# Patient Record
Sex: MALE | Race: ASIAN | NOT HISPANIC OR LATINO | URBAN - METROPOLITAN AREA
[De-identification: names, ages, dates, MRNs, and addresses within clinical notes are randomized per-mention and may not be internally consistent; named-entity substitution may affect disease eponyms.]

---

## 2018-05-25 ENCOUNTER — OFFICE VISIT - HEALTHEAST (OUTPATIENT)
Dept: FAMILY MEDICINE | Facility: CLINIC | Age: 38
End: 2018-05-25

## 2018-05-25 DIAGNOSIS — H66.93 BILATERAL OTITIS MEDIA: ICD-10-CM

## 2018-05-25 DIAGNOSIS — J32.9 SINUSITIS: ICD-10-CM

## 2018-05-25 DIAGNOSIS — J30.9 ALLERGIC RHINITIS: ICD-10-CM

## 2018-05-31 ENCOUNTER — OFFICE VISIT - HEALTHEAST (OUTPATIENT)
Dept: ALLERGY | Facility: CLINIC | Age: 38
End: 2018-05-31

## 2018-05-31 DIAGNOSIS — J30.9 CHRONIC ALLERGIC RHINITIS, UNSPECIFIED SEASONALITY, UNSPECIFIED TRIGGER: ICD-10-CM

## 2018-05-31 ASSESSMENT — MIFFLIN-ST. JEOR: SCORE: 1399.55

## 2018-06-07 ENCOUNTER — COMMUNICATION - HEALTHEAST (OUTPATIENT)
Dept: ALLERGY | Facility: CLINIC | Age: 38
End: 2018-06-07

## 2018-06-12 ENCOUNTER — AMBULATORY - HEALTHEAST (OUTPATIENT)
Dept: ALLERGY | Facility: CLINIC | Age: 38
End: 2018-06-12

## 2018-06-12 DIAGNOSIS — J30.1 SEASONAL ALLERGIC RHINITIS DUE TO POLLEN, UNSPECIFIED CHRONICITY: ICD-10-CM

## 2018-06-15 ENCOUNTER — AMBULATORY - HEALTHEAST (OUTPATIENT)
Dept: ALLERGY | Facility: CLINIC | Age: 38
End: 2018-06-15

## 2018-06-15 DIAGNOSIS — J30.1 SEASONAL ALLERGIC RHINITIS DUE TO POLLEN, UNSPECIFIED CHRONICITY: ICD-10-CM

## 2020-03-10 ENCOUNTER — OFFICE VISIT (OUTPATIENT)
Dept: FAMILY MEDICINE | Facility: CLINIC | Age: 40
End: 2020-03-10
Payer: COMMERCIAL

## 2020-03-10 VITALS
OXYGEN SATURATION: 99 % | WEIGHT: 136 LBS | RESPIRATION RATE: 16 BRPM | SYSTOLIC BLOOD PRESSURE: 126 MMHG | TEMPERATURE: 97.7 F | DIASTOLIC BLOOD PRESSURE: 79 MMHG | BODY MASS INDEX: 25.03 KG/M2 | HEIGHT: 62 IN | HEART RATE: 69 BPM

## 2020-03-10 DIAGNOSIS — J06.9 VIRAL URI WITH COUGH: ICD-10-CM

## 2020-03-10 DIAGNOSIS — R59.9 REACTIVE LYMPHADENOPATHY: Primary | ICD-10-CM

## 2020-03-10 DIAGNOSIS — Z23 NEED FOR PROPHYLACTIC VACCINATION AND INOCULATION AGAINST INFLUENZA: ICD-10-CM

## 2020-03-10 ASSESSMENT — MIFFLIN-ST. JEOR: SCORE: 1408.14

## 2020-03-10 NOTE — PATIENT INSTRUCTIONS
- Continue taking Tylenol as needed, would recommend 2-3x a day  - Return if feeling worse, or in the next week if not improved

## 2020-03-10 NOTE — PROGRESS NOTES
Preceptor Attestation:   Patient seen, evaluated and discussed with the resident. I have verified the content of the note, which accurately reflects my assessment of the patient and the plan of care.   Supervising Physician:  Sylvia Knapp MD

## 2020-03-10 NOTE — PROGRESS NOTES
"[facility] Visit    Subjective:  Kailey Morrissey is a 39 year old male with a PMHx significant for   Patient Active Problem List   Diagnosis     Other allergy, other than to medicinal agents     Disorder of bursae and tendons in shoulder region     Environmental allergies     Osteoarthritis     Rotator cuff tendonitis    who presents with a sore throat.     Patient reports 8-9 days of a sore throat.  He feels focal tenderness on both sides of his upper trachea, just below his mandible.  He can't feel any swollen/tendern lumps but does think it generally feel [place]wood there.  No subjective fevers, chills.  He doesn't feel sick currently but had a febrile illness prior to his sore throat that resolved.  Today, no fatigue, lethargy, runny nose, sinus drainage/tenderness, headache, vision changes, ear pain, wheezing, vomiting, abdominal pain, regurgitation, reflux, diarrhea, rash.  No sick contacts or recent travel.  Has tried Tylenol a few times with some relief.     Preventative care: not UTD, agreeable for flu shot but not lipids or HIV.     Objective:  Vitals:    03/10/20 1618   BP: 126/79   BP Location: Left arm   Pulse: 69   Resp: 16   Temp: 97.7  F (36.5  C)   TempSrc: Oral   SpO2: 99%   Weight: 61.7 kg (136 lb)   Height: 1.578 m (5' 2.13\")     Body mass index is 24.77 kg/m .    GEN: NAD, healthy, alert  EYES: grossly normal to inspection, PERRL, EOMI, normal conjunctivae/sclerae  HENT: normal ear canals/TM's, nose & mouth w/o ulcers or lesions, clear oropharynx, MMM, no tonsillar swelling/exudates/erythema, no erythema in posterior mouth, no uvula deviation, no swelling or masses  NECK: mild focal tenderness on either side of trachea/esophagus just below mandible. No LAD was appreciated. No masses. No thyroid nodules or tenderness  RESP: CTAB, no w/r/r  CV: RRR, nl S1/S2, no m/r/g, no peripheral edema  MSK: no MSK defects noted, gait is age appropriate w/o ataxia  SKIN: no suspicious lesions or " rashes  NEURO: no obvious focal deficits, mentation intact, speech normal    Assessment/Plan:  Kailey was seen today for pharyngitis and cough.    Diagnoses and all orders for this visit:    Reactive lymphadenopathy  Viral URI with cough  Patient presents with almost 9 days of sore throat after a febrile illness.  He likely had a viral URI that is resolving, and now experiencing reactive lymphadenopathy.  He points to focal areas over his submandibular lymph nodes as tender areas.  He denies any significant respiratory symptoms concerning for trachea problems nor GI symptoms concerning for esophagitis.  He has no symptoms concerning for thyroid disease and has no tenderness/nodules on exam.  No s/sx of other infection on exam.  His vitals are normal and he appears quite well today.  Recommend symptomatic treatment (hydration, rest) and Tylenol PRN.  RTC or go to ED if concerns.       Options for treatment and follow-up care were reviewed with the patient who was engaged and actively involved in the decision making process, verbalized understanding of the options discussed, and satisfied with the final plan.    Patient was staffed with supervising physician, Dr. Knapp.     Joqauin Waller MD, PGY3  [facility]

## 2021-06-01 VITALS — BODY MASS INDEX: 24.23 KG/M2 | WEIGHT: 134.6 LBS

## 2021-06-01 VITALS — HEIGHT: 63 IN | BODY MASS INDEX: 23.92 KG/M2 | WEIGHT: 135 LBS

## 2021-06-18 NOTE — PROGRESS NOTES
RW  1:1000 V/V EXP 08/14/2018  1:100 V/V EXP 10/14/2018  1:10 V /V EXP 6/14/2019  1:1 V /V EXP 6/14/2019  POLLENS  1:1000 V/V EXP 8/14/2018  1:100 V/V EXP 10/14/2018  1:10 V /V EXP 6/14/2019  1:1 V /V EXP 6/14/2019      HE MPW  CHECKED BY LT  CHARGED 60 UNITS of 60 UNITS

## 2021-06-18 NOTE — PROGRESS NOTES
"Chief complaint:  Start Allergy Shots    History of present illness: This is a pleasant 38-year-old gentleman who is here today to discuss allergy shots.  I saw him in 2016.  He is positive at the time to ragweed and Bhavani.  He reports his nasal congestion continues to increase.  At that time he had discussed allergy shots.  He states he has thought about this and would like to start allergy shots.  Antihistamines and nasal sprays have been ineffective for him in the past.  He has not been on Singulair.  He has no history of asthma.  No cough, wheeze or shortness of breath.  He states he has a lot of drainage in his eyes are very itchy.    Past medical history, social history, family medical history, meds and allergies reviewed and updated accordingly.    Review of Systems performed as above and the remainder is negative.      Current Outpatient Prescriptions:      EPINEPHrine (EPIPEN) 0.3 mg/0.3 mL injection, Inject into thigh for allergic reaction, Disp: 2 Pre-filled Pen Syringe, Rfl: 0     montelukast (SINGULAIR) 10 mg tablet, Take 1 tablet (10 mg total) by mouth at bedtime., Disp: 30 tablet, Rfl: 1    Allergies   Allergen Reactions     Histamine H2 Inhibitors      Histamine control on allergy test     Ragweed Pollen      Kyree      Kyree/digwood     Sugardyne      Maple sugar tree       Pulse 60  Ht 5' 2.5\" (1.588 m)  Wt 135 lb (61.2 kg)  BMI 24.3 kg/m2  Gen: Pleasant male not in acute distress  HEENT: Eyes no erythema of the bulbar or palpebral conjunctiva, no edema. Ears: TMs well visualized, no effusions. Nose: congestion, mucosa pale. Mouth: Throat drainage , no lip or tongue edema.   Cardiac: Regular rate and rhythm, no murmurs, rubs or gallops  Respiratory: Clear to auscultation bilaterally, no adventitious breath sounds    Skin: No rashes or lesions  Psych: Alert and oriented times 3    Impression report and plan:    1.  Allergic rhinitis    I went over the risks and benefits of allergy shots.  I stated " risks include hives, swelling, shortness of breath.  I did state that one in 2.5 million shot administrations can result in death.  I stated they must wait in the office for 30 minutes following the shot and carry an epinephrine device on the day of the shot.  I stated that shots are effective in about 90% of patients.  I stated that they should check with the insurance company prior to proceeding.  They understand the risks and benefits and agreed to proceed. I would like him to start montelukast.  I cautioned him to the rare side effect of mood disturbance.  Follow during allergy shots.    Time spent with patient, 25 minutes, greater than half spent counseling and coordination of care regarding allergy shots.

## 2021-06-18 NOTE — PROGRESS NOTES
RW  1:1000 V/V EXP 08/14/2018  1:100 V/V EXP 10/14/2018  1:10 V /V EXP 6/14/2019  1:1 V /V EXP 6/14/2019  POLLENS  1:1000 V/V EXP 8/14/2018  1:100 V/V EXP 10/14/2018  1:10 V /V EXP 6/14/2019  1:1 V /V EXP 6/14/2019      HE MPW  CHECKED BY LT  CHARGED 30 UNITS of 60 UNITS

## 2021-06-26 NOTE — PROGRESS NOTES
Progress Notes by Sammy Aguilar DO at 5/25/2018 11:00 AM     Author: Sammy Aguilar DO Service: -- Author Type: Physician    Filed: 5/25/2018 12:41 PM Encounter Date: 5/25/2018 Status: Signed    : Sammy Aguilar DO (Physician)       Chief Complaint   Patient presents with   ? Nasal congestion - when lays down at night has a hard time     breathing is not on any medicine for allergies - see his phone - had allergy testing done with Dr Moshe asher at [facility].        History of Present Illness: Rooming staff notes reviewed. Patient has itchy nose and eyes, especially in the morning. He has daily nasal congestion, less severe in the Winter time. No asthma like symptoms. He has not gotten significant relief using cetrizine, or a nasal spray (unsure what the name was).  I reviewed the notes by the allergist from 2016, where she suggested trying montelukast for allergy treatment.  He has not yet tried this.  Besides the allergy symptoms, he has also had some discomfort in his ears for the past 2 months.    Review of systems: See history of present illness, otherwise negative.     Current Outpatient Prescriptions   Medication Sig Dispense Refill   ? amoxicillin (AMOXIL) 500 MG capsule Take 2 capsules (1,000 mg total) by mouth 2 (two) times a day for 10 days. 40 capsule 0   ? montelukast (SINGULAIR) 10 mg tablet Take 1 tablet (10 mg total) by mouth daily. 30 tablet 2     No current facility-administered medications for this visit.        No past medical history on file.   No past surgical history on file.   Social History     Social History   ? Marital status:      Spouse name: N/A   ? Number of children: N/A   ? Years of education: N/A     Social History Main Topics   ? Smoking status: Never Smoker   ? Smokeless tobacco: Never Used   ? Alcohol use None   ? Drug use: None   ? Sexual activity: Not Asked     Other Topics Concern   ? None     Social History Narrative       History   Smoking Status   ? Never Smoker    Smokeless Tobacco   ? Never Used      Exam:   Blood pressure 100/70, pulse 70, temperature 97.7  F (36.5  C), temperature source Oral, resp. rate 20, weight 134 lb 9.6 oz (61.1 kg), SpO2 97 %.    EXAM:   General: Vital signs reviewed. Patient is in no acute appearing distress. Breathing is non labored appearing. Patient is alert and oriented x 3.   ENT: Ear exam shows bilateral TM dullness and injection, nasal turbinates are injected and edematous with clear mucoid rhinorrhea, no pharyngeal injection with increased post nasal drainage noted.  Patient frequently sniffles during exam.  Neck: supple with no adenopathy.  Heart: Normal rate and rhythm without murmur  Lungs: Clear to auscultation with good air flow bilaterally.  Skin: warm and dry    Assessment/Plan   1. Allergic rhinitis  montelukast (SINGULAIR) 10 mg tablet   2. Sinusitis  amoxicillin (AMOXIL) 500 MG capsule   3. Bilateral otitis media  amoxicillin (AMOXIL) 500 MG capsule       Patient Instructions     Also see info below. Try to follow up with the allergist, or your primary care provider. Take your amoxicillin until it is all taken. Take your Singulair (montelucast) daily. I think your sinus infection and ear infection is related to your chronic allergies. The better you control your allergies, the fewer infections you will likely get.    Allergic Rhinitis  Allergic rhinitis is an allergic reaction that affects the nose, and often the eyes. Its often known as nasal allergies. Nasal allergies are often due to things in the environment that are breathed in. Depending what you are sensitive to, nasal allergies may occur only during certain seasons. Or they may occur year round. Common indoor allergens include house dust mites, mold, cockroaches, and pet dander. Outdoor allergens include pollen from trees, grasses, and weeds.   Symptoms include a drippy, stuffy, and itchy nose. They also include sneezing and red and itchy eyes. You may feel tired more  often. Severe allergies may also affect your breathing and trigger a condition called asthma.   Tests can be done to see what allergens are affecting you. You may be referred to an allergy specialist for testing and further evaluation.  Home care  Your healthcare provider may prescribe medicines to help relieve allergy symptoms. These may include oral medicines, nasal sprays, or eye drops.  Ask your provider for advice on how to avoid substances that you are allergic to. Below are a few tips for each type of allergen.  Pet dander:    Do not have pets with fur and feathers.    If you can't avoid having a pet, keep it out of your bedroom and off upholstered furniture.  Pollen:    When pollen counts are high, keep windows of your car and home closed. If possible, use an air conditioner instead.    Wear a filter mask when mowing or doing yard work.  House dust mites:    Wash bedding every week in warm water and detergent and dry on a hot setting.    Cover the mattress, box spring, and pillows with allergy covers.     If possible, sleep in a room with no carpet, curtains, or upholstered furniture.  Cockroaches:    Store food in sealed containers.    Remove garbage from the home promptly.    Fix water leaks  Mold:    Keep humidity low by using a dehumidifier or air conditioner. Keep the dehumidifier and air conditioner clean and free of mold.    Clean moldy areas with bleach and water.  In general:    Vacuum once or twice a week. If possible, use a vacuum with a high-efficiency particulate air (HEPA) filter.    Do not smoke. Avoid cigarette smoke. Cigarette smoke is an irritant that can make symptoms worse.  Follow-up care  Follow up as advised by the healthcare provider or our staff. If you were referred to an allergy specialist, make this appointment promptly.  When to seek medical advice  Call your healthcare provider right away if the following occur:    Coughing or wheezing    Fever of 100.4 F (38 C) or higher, or as  directed by your healthcare provider    Raised red bumps (hives)    Continuing symptoms, new symptoms, or worsening symptoms  Call 911 if you have:    Trouble breathing    Severe swelling of the face or severe itching of the eyes or mouth  Date Last Reviewed: 3/1/2017    4118-9508 [employer]. [address] [place] [address]. All rights reserved. This information is not intended as a substitute for professional medical care. Always follow your healthcare professional's instructions.        Causes of Nasal Allergies    Nasal allergies are most commonly caused by one or more of 4 kinds of allergens: pollen (which causes seasonal allergies), house-dust mites, mold, and animals. Other substances, called irritants, can bother the nose and make allergy symptoms worse.  Pollen  Plants reproduce by moving tiny grains of pollen from plant to plant. Some pollen is carried by bees, and some is blown by the wind. Its the wind-blown pollen that causes nasal allergies. The amount of pollen in the air varies from season to season.  House-dust mites  House-dust mites are tiny bugs too small to see. They can live in mattresses, blankets, stuffed toys, carpets, and curtains. The droppings of these mites are a common indoor cause of nasal allergies.  Mold  Mold loves dark, damp areas. It tends to grow in bathrooms, basements, refrigerators, and in the soil of houseplants. Mold reproduces by sending tiny grains called spores into the air. If these spores are breathed in, they can cause a nasal allergic reaction.  Animals  Pets, such as cats, dogs, birds, horses, and rabbits, are common causes of nasal allergies. Flakes of skin (dander), saliva left on fur when an animal cleans itself, urine in litter boxes and cages, and feathers can all cause nasal allergies.  Irritants make allergies worse  Although irritants dont cause nasal allergies, they can make allergy symptoms worse. Cigarette smoke, perfume, aerosol  sprays, smoke from wood stoves or fireplaces, car exhaust, and strong odors are examples of irritants.   Date Last Reviewed: 9/1/2016 2000-2017 [employer]. [address] [place] [address], [place] [address]. All rights reserved. This information is not intended as a substitute for professional medical care. Always follow your healthcare professional's instructions.        Understanding Nasal Allergies  Nasal allergies (also called allergic rhinitis) are a common health problem. They may be seasonal. This means they cause symptoms only at certain times of the year. Or they may be perennial. This means they cause symptoms all year long. Other health problems, such as asthma, often occur along with allergies as well.    What is an allergic reaction?  An allergy is a reaction to a substance called an allergen. Common allergens include:    Wind-borne pollen    Mold    Dust mites    Furry and feathered animals    Cockroaches  Normally, allergens are harmless. But when a person has allergies, the body thinks they are harmful. The body then attacks allergens with antibodies. Antibodies are attached to special cells called mast cells. Allergens stick to the antibodies. This makes the mast cells release histamine and other chemicals. This is an allergic reaction. The chemicals irritate nearby nasal tissue. This causes nasal allergy symptoms.  Common nasal allergy symptoms  Allergies can cause nasal tissue to swell. This makes the air passages smaller. The nose may feel stuffed up. The nose may also make extra mucus, which can plug the nasal passages or drip out of the nose. Mucus can drip down the back of the throat (postnasal drip) as well. Sinus tissue can swell. This may cause pain and headache. Common allergy symptoms include:    Runny nose with clear, watery discharge    Stuffy nose (nasal congestion)    Drainage down your throat (postnasal drip)    Sneezing    Red, watery eyes    Itchy nose, eyes, ears, and  throat    Plugged-up ears (ear congestion)    Sore throat    Coughing    Sinus pain and swelling    Headache  It may not be allergies  Other health problems can cause symptoms like those of nasal allergies. These include:    Nonallergic rhinitis and viruses such as colds    Irritants and pollutants, such as strong odors or smoke    Certain medicines    Changes in the weather   Treatment  Your healthcare provider will evaluate you to find the cause of your symptoms then recommend treatment. If your symptoms are due to nasal allergies, your healthcare provider may prescribe nasal steroid sprays or oral antihistamines to help reduce symptoms. Avoidance of the allergen will also be suggested. You may also be referred to an allergist.   Date Last Reviewed: 10/1/2016    0122-9670 [employer]. [address] [place] [address], [place] [address]. All rights reserved. This information is not intended as a substitute for professional medical care. Always follow your healthcare professional's instructions.        Acute Sinusitis    Acute sinusitis is irritation and swelling of the sinuses. It is usually caused by a viral infection after a common cold. Your doctor can help you find relief.  What is acute sinusitis?  Sinuses are air-filled spaces in the skull behind the face. They are kept moist and clean by a lining of mucosa. Things such as pollen, smoke, and chemical fumes can irritate the mucosa. It can then swell up. As a response to irritation, the mucosa makes more mucus and other fluids. Tiny hairlike cilia cover the mucosa. Cilia help carry mucus toward the opening of the sinus. Too much mucus may cause the cilia to stop working. This blocks the sinus opening. A buildup of fluid in the sinuses then causes pain and pressure. It can also encourage bacteria to grow in the sinuses.  Common symptoms of acute sinusitis  You may have:    Facial soreness pain    Headache    Fever    Fluid draining in the back of the throat  (postnasal drip)    Congestion    Drainage that is thick and colored, instead of clear    Cough  Diagnosing acute sinusitis  Your doctor will ask about your symptoms and health history. He or she will look at your ear, nose, and throat. You usually won't need to have X-rays taken.    The doctor may take a sample of mucus to check for bacteria. If you have sinusitis that keeps coming back, you may need imaging tests such as X-rays or CAT scans. This will help your doctor check for a structural problem that may be causing the infection.  Treating acute sinusitis  Treatment is aimed at unblocking the sinus opening and helping the cilia work again. You may need to take antihistamine and decongestant medicine. These can reduce inflammation and decrease the amount of fluid your sinuses make. If you have a bacterial infection, you will need to take antibiotic medicine for 10 to 14 days. Take this medicine until it is gone, even if you feel better.  Date Last Reviewed: 10/1/2016    9896-8276 [employer]. [address] [place] [address]. All rights reserved. This information is not intended as a substitute for professional medical care. Always follow your healthcare professional's instructions.      Montelukast Sodium Oral tablet  What is this medicine?  MONTELUKAST (mon te LOO kast) is used to prevent and treat the symptoms of asthma. It is also used to treat allergies. Do not use for an acute asthma attack.  This medicine may be used for other purposes; ask your health care provider or pharmacist if you have questions.  What should I tell my health care provider before I take this medicine?  They need to know if you have any of these conditions:    liver disease    an unusual or allergic reaction to montelukast, other medicines, foods, dyes, or preservatives    pregnant or trying to get pregnant    breast-feeding  How should I use this medicine?  This medicine should be given by mouth. Follow the  directions on the prescription label. Take this medicine at the same time every day. You may take this medicine with or without meals. Do not chew the tablets. Do not stop taking your medicine unless your doctor tells you to.  Talk to your pediatrician regarding the use of this medicine in children. Special care may be needed. While this drug may be prescribed for children as young as 15 years of age for selected conditions, precautions do apply.  Overdosage: If you think you have taken too much of this medicine contact a poison control center or emergency room at once.  NOTE: This medicine is only for you. Do not share this medicine with others.  What if I miss a dose?  If you miss a dose, take it as soon as you can. If it is almost time for your next dose, take only that dose. Do not take double or extra doses.  What may interact with this medicine?    anti-infectives like rifampin and rifabutin    medicines for diabetes like rosiglitazone and repaglinide    medicines for seizures like phenytoin, phenobarbital, and carbamazepine    paclitaxel  This list may not describe all possible interactions. Give your health care provider a list of all the medicines, herbs, non-prescription drugs, or dietary supplements you use. Also tell them if you smoke, drink alcohol, or use illegal drugs. Some items may interact with your medicine.  What should I watch for while using this medicine?  Visit your doctor or health care professional for regular checks on your progress. Tell your doctor or health care professional if your allergy or asthma symptoms do not improve. Take your medicine even when you do not have symptoms. Do not stop taking any of your medicine(s) unless your doctor tells you to.  If you have asthma, talk to your doctor about what to do in an acute asthma attack. Always have your inhaled rescue medicine for asthma attacks with you.  Patients and their families should watch for new or worsening thoughts of suicide  or depression. Also watch for sudden changes in feelings such as feeling anxious, agitated, panicky, irritable, hostile, aggressive, impulsive, severely restless, overly excited and hyperactive, or not being able to sleep. Any worsening of mood or thoughts of suicide or dying should be reported to your health care professional right away.  What side effects may I notice from receiving this medicine?  Side effects that you should report to your doctor or health care professional as soon as possible:    allergic reactions like skin rash or hives, or swelling of the face, lips, or tongue    breathing problems    confusion    dark urine    fever or infection    flu-like symptoms    hallucinations    painful lumps under the skin    pain, tingling, numbness in the hands or feet    sinus pain or swelling    suicidal thoughts or other mood changes    trouble sleeping    unusual bleeding or bruising    yellowing of the eyes or skin  Side effects that usually do not require medical attention (report to your doctor or health care professional if they continue or are bothersome):    cough    dizziness    drowsiness    headache    nightmares    stomach upset    stuffy nose  This list may not describe all possible side effects. Call your doctor for medical advice about side effects. You may report side effects to FDA at 3-825-FDA-4653.  Where should I keep my medicine?  Keep out of the reach of children.  Store at room temperature between 15 and 30 degrees C (59 and 86 degrees F). Protect from light and moisture. Keep this medicine in the original bottle. Throw away any unused medicine after the expiration date.  NOTE:This sheet is a summary. It may not cover all possible information. If you have questions about this medicine, talk to your doctor, pharmacist, or health care provider. Copyright  2015 [employer]           Sammy Aguilar DO

## 2021-12-10 ENCOUNTER — OFFICE VISIT (OUTPATIENT)
Dept: FAMILY MEDICINE | Facility: CLINIC | Age: 41
End: 2021-12-10
Payer: COMMERCIAL

## 2021-12-10 VITALS
SYSTOLIC BLOOD PRESSURE: 123 MMHG | BODY MASS INDEX: 24.95 KG/M2 | RESPIRATION RATE: 16 BRPM | HEIGHT: 63 IN | TEMPERATURE: 97.6 F | DIASTOLIC BLOOD PRESSURE: 75 MMHG | HEART RATE: 69 BPM | OXYGEN SATURATION: 97 % | WEIGHT: 140.8 LBS

## 2021-12-10 DIAGNOSIS — Z00.00 ROUTINE GENERAL MEDICAL EXAMINATION AT A HEALTH CARE FACILITY: Primary | ICD-10-CM

## 2021-12-10 LAB
CHOLEST SERPL-MCNC: 206 MG/DL
FASTING STATUS PATIENT QL REPORTED: ABNORMAL
HDLC SERPL-MCNC: 51 MG/DL
LDLC SERPL CALC-MCNC: 125 MG/DL
TRIGL SERPL-MCNC: 148 MG/DL

## 2021-12-10 PROCEDURE — 36415 COLL VENOUS BLD VENIPUNCTURE: CPT | Performed by: STUDENT IN AN ORGANIZED HEALTH CARE EDUCATION/TRAINING PROGRAM

## 2021-12-10 PROCEDURE — 99396 PREV VISIT EST AGE 40-64: CPT | Mod: GC | Performed by: STUDENT IN AN ORGANIZED HEALTH CARE EDUCATION/TRAINING PROGRAM

## 2021-12-10 PROCEDURE — 80061 LIPID PANEL: CPT | Performed by: STUDENT IN AN ORGANIZED HEALTH CARE EDUCATION/TRAINING PROGRAM

## 2021-12-10 ASSESSMENT — MIFFLIN-ST. JEOR: SCORE: 1426.17

## 2021-12-10 NOTE — PROGRESS NOTES
Male Physical Note    Concerns today: Had a little cough 2 weeks ago, took medication from [facility] for past week that improved his cough.     No  needed per patient request    ROS:                      CONSTITUTIONAL: no fatigue, no unexpected change in weight  SKIN: no worrisome rashes, no worrisome moles, no worrisome lesions  EYES: no acute vision problems or changes  ENT: no ear problems, no mouth problems, no throat problems  RESP: no significant cough, no shortness of breath  CV: no chest pain, no palpitations, no new or worsening peripheral edema  GI: no nausea, no vomiting, no constipation, no diarrhea    Past Medical History:   Diagnosis Date     Arthritis         Family History   Problem Relation Age of Onset     Diabetes No family hx of      Breast Cancer No family hx of      Cancer - colorectal No family hx of      Ovarian Cancer No family hx of      Prostate Cancer No family hx of      Coronary Artery Disease No family hx of    Son with Marfan syndrome,     Family History and past Medical History reviewed and unchanged/updated.    Social History     Tobacco Use     Smoking status: Never Smoker     Smokeless tobacco: Never Used   Substance Use Topics     Alcohol use: No     Comment: occa      , 25 years  Children ? 2 Daughters    Has anyone hurt you physically, for example by pushing, hitting, slapping or kicking you or forcing you to have sex? Denies  Do you feel threatened or controlled by a partner, ex-partner or anyone in your life? Denies    RISK BEHAVIORS AND HEALTHY HABITS:  Tobacco Use/Smoking: None  Illicit Drug Use: None  ETOH: 4-5 bottles of beer every 2 to 3 weeks on average  Do you use alcohol? No  Sexually Active: No  Diet (5-7 servings of fruits/veg daily): Yes   Exercise (30 min accumulated most days):Yes, works 14 hr days, works as .   Dental Care: No , last dentist visit 5 years ago   Calcium 1500 mg/d:  Yes  Seat Belt Use: Yes     Cholesterol Level  "(>44 yo or at risk):  Recommended and patient accepted testing.    CV Risk based on Pooled Cohort Risk: TBD       Immunization History   Administered Date(s) Administered     COVID-19,PF,Moderna 03/10/2021, 04/08/2021     HepA-Adult 08/01/2016     HepB 01/11/2010, 11/09/2012, 08/01/2016     Influenza (IIV3) PF 10/08/2012     Influenza Vaccine, 6+MO IM (QUADRIVALENT W/PRESERVATIVES) 09/27/2016, 03/10/2020     MMR 03/09/2011, 11/09/2012     Poliovirus, inactivated (IPV) 08/01/2016     Tdap (Adacel,Boostrix) 12/11/2009     Tetanus 03/09/2011, 11/09/2012     Typhoid IM 08/01/2016     Varicella 03/09/2011, 11/09/2012   Reviewed Immunization Record Today  Influenza vaccine recommended but patient denied today     EXAMINATION:  /75 (BP Location: Left arm, Cuff Size: Adult Small)   Pulse 69   Temp 97.6  F (36.4  C)   Resp 16   Ht 1.588 m (5' 2.52\")   Wt 63.9 kg (140 lb 12.8 oz)   SpO2 97%   BMI 25.33 kg/m    GENERAL: healthy, alert and no distress  EYES: Eyes grossly normal to inspection, extraocular movements - intact, and PERRL  HENT: ear canals- normal; TMs- normal; Nose- normal; Mouth- no ulcers, no lesions. Scattered   NECK: no tenderness, no adenopathy, no asymmetry, no masses, no stiffness; thyroid- normal to palpation  RESP: lungs clear to auscultation - no rales, no rhonchi, no wheezes  CV: regular rates and rhythm, normal S1 S2, no S3 or S4 and no murmur, no click or rub -  ABDOMEN: soft, no tenderness, no  hepatosplenomegaly, no masses, normal bowel sounds  MS: extremities- no gross deformities noted, no edema  SKIN: no suspicious lesions, no rashes  NEURO: strength and tone- normal, sensory exam- grossly normal, mentation- intact, speech- normal, reflexes- symmetric  BACK: no CVA tenderness, no paralumbar tenderness  - Declined by patient  RECTAL- Declined by patient  PSYCH: Alert and oriented times 3; speech- coherent , normal rate and volume; able to articulate logical thoughts, able to abstract " reason, no tangential thoughts, no hallucinations or delusions, affect- normal  LYMPHATICS: ant. cervical- normal, post. cervical- normal, axillary- normal, supraclavicular- normal, inguinal- normal    ASSESSMENT:  1. Health Care Maintenance: Normal Physical Exam    PLAN:  1. Lipid panel ordered.   2. Monitor mental health with recent losses. Pt lost son in 2019, brother last year, and another close family member 2 years ago.     Results cited below have been reviewed and communicated to patient.    Options for treatment and follow-up care were reviewed with the patient who was engaged and actively involved in the decision making process, verbalized understanding of the options discussed, and satisfied with the final plan.    Patient was staffed with supervising physician, Dr. Chanel Callejas, who agrees with the findings and plan.     Sahil Dumont DO, PGY2  [facility]

## 2021-12-10 NOTE — PATIENT INSTRUCTIONS
We will call you with lab results of cholesterol test and if anything needs to be changed.       Preventive Health Recommendations  Male Ages 40 to 49    Yearly exam:             See your health care provider every year in order to  o   Review health changes.   o   Discuss preventive care.    o   Review your medicines if your doctor has prescribed any.    You should be tested each year for STDs (sexually transmitted diseases) if you re at risk.     Have a cholesterol test every 5 years.     Have a colonoscopy (test for colon cancer) if someone in your family has had colon cancer or polyps before age 50.     After age 45, have a diabetes test (fasting glucose). If you are at risk for diabetes, you should have this test every 3 years.      Talk with your health care provider about whether or not a prostate cancer screening test (PSA) is right for you.    Shots: Get a flu shot each year. Get a tetanus shot every 10 years.     Nutrition:    Eat at least 5 servings of fruits and vegetables daily.     Eat whole-grain bread, whole-wheat pasta and brown rice instead of white grains and rice.     Get adequate Calcium and Vitamin D.     Lifestyle    Exercise for at least 150 minutes a week (30 minutes a day, 5 days a week). This will help you control your weight and prevent disease.     Limit alcohol to one drink per day.     No smoking.     Wear sunscreen to prevent skin cancer.     See your dentist every six months for an exam and cleaning.

## 2021-12-10 NOTE — PROGRESS NOTES
"Preceptor attestation:  Vital signs reviewed: /75 (BP Location: Left arm, Cuff Size: Adult Small)   Pulse 69   Temp 97.6  F (36.4  C)   Resp 16   Ht 1.588 m (5' 2.52\")   Wt 63.9 kg (140 lb 12.8 oz)   SpO2 97%   BMI 25.33 kg/m      Patient seen, evaluated, and discussed with the resident.  I have verified the content of the note, which accurately reflects my assessment of the patient and the plan of care.    Supervising physician: Chanel Callejas MD  [facility]    "